# Patient Record
Sex: FEMALE | URBAN - METROPOLITAN AREA
[De-identification: names, ages, dates, MRNs, and addresses within clinical notes are randomized per-mention and may not be internally consistent; named-entity substitution may affect disease eponyms.]

---

## 2024-07-09 ENCOUNTER — APPOINTMENT (RX ONLY)
Dept: URBAN - METROPOLITAN AREA CLINIC 53 | Facility: CLINIC | Age: 39
Setting detail: DERMATOLOGY
End: 2024-07-09

## 2024-07-09 DIAGNOSIS — L71.0 PERIORAL DERMATITIS: ICD-10-CM | Status: INADEQUATELY CONTROLLED

## 2024-07-09 DIAGNOSIS — L57.8 OTHER SKIN CHANGES DUE TO CHRONIC EXPOSURE TO NONIONIZING RADIATION: ICD-10-CM | Status: STABLE

## 2024-07-09 PROCEDURE — ? CONSENT FOR TELEMEDICINE VISIT OBTAINED

## 2024-07-09 PROCEDURE — ? PRESCRIPTION

## 2024-07-09 PROCEDURE — 99203 OFFICE O/P NEW LOW 30 MIN: CPT

## 2024-07-09 PROCEDURE — ? COUNSELING

## 2024-07-09 PROCEDURE — ? TREATMENT REGIMEN

## 2024-07-09 RX ORDER — TACROLIMUS 1 MG/G
OINTMENT TOPICAL
Qty: 30 | Refills: 1 | Status: ERX | COMMUNITY
Start: 2024-07-09

## 2024-07-09 RX ADMIN — TACROLIMUS: 1 OINTMENT TOPICAL at 00:00

## 2024-07-09 ASSESSMENT — LOCATION DETAILED DESCRIPTION DERM
LOCATION DETAILED: LEFT MEDIAL BUCCAL CHEEK
LOCATION DETAILED: RIGHT LOWER CUTANEOUS LIP

## 2024-07-09 ASSESSMENT — LOCATION ZONE DERM
LOCATION ZONE: FACE
LOCATION ZONE: LIP

## 2024-07-09 ASSESSMENT — LOCATION SIMPLE DESCRIPTION DERM
LOCATION SIMPLE: LEFT CHEEK
LOCATION SIMPLE: RIGHT LIP

## 2024-07-19 ENCOUNTER — APPOINTMENT (RX ONLY)
Dept: URBAN - METROPOLITAN AREA CLINIC 49 | Facility: CLINIC | Age: 39
Setting detail: DERMATOLOGY
End: 2024-07-19

## 2024-07-19 DIAGNOSIS — L71.0 PERIORAL DERMATITIS: ICD-10-CM | Status: INADEQUATELY CONTROLLED

## 2024-07-19 PROCEDURE — 99213 OFFICE O/P EST LOW 20 MIN: CPT

## 2024-07-19 PROCEDURE — ? PRESCRIPTION MEDICATION MANAGEMENT

## 2024-07-19 PROCEDURE — ? PRESCRIPTION

## 2024-07-19 PROCEDURE — ? COUNSELING

## 2024-07-19 RX ORDER — DOXYCYCLINE HYCLATE 100 MG/1
CAPSULE, GELATIN COATED ORAL BID
Qty: 60 | Refills: 1 | Status: ERX | COMMUNITY
Start: 2024-07-19

## 2024-07-19 RX ORDER — CLINDAMYCIN PHOSPHATE 10 MG/ML
LOTION TOPICAL
Qty: 60 | Refills: 3 | Status: ERX | COMMUNITY
Start: 2024-07-19

## 2024-07-19 RX ADMIN — DOXYCYCLINE HYCLATE: 100 CAPSULE, GELATIN COATED ORAL at 00:00

## 2024-07-19 RX ADMIN — CLINDAMYCIN PHOSPHATE: 10 LOTION TOPICAL at 00:00

## 2024-07-19 ASSESSMENT — LOCATION DETAILED DESCRIPTION DERM
LOCATION DETAILED: RIGHT LOWER CUTANEOUS LIP
LOCATION DETAILED: LEFT MEDIAL BUCCAL CHEEK

## 2024-07-19 ASSESSMENT — LOCATION ZONE DERM
LOCATION ZONE: LIP
LOCATION ZONE: FACE

## 2024-07-19 ASSESSMENT — LOCATION SIMPLE DESCRIPTION DERM
LOCATION SIMPLE: RIGHT LIP
LOCATION SIMPLE: LEFT CHEEK

## 2024-07-19 NOTE — PROCEDURE: PRESCRIPTION MEDICATION MANAGEMENT
Detail Level: Zone
Initiate Treatment: Doxycycline 100 mg BID \\n\\nClindamycin 1% lotion BID
Render In Strict Bullet Format?: No